# Patient Record
Sex: MALE | Employment: OTHER | URBAN - METROPOLITAN AREA
[De-identification: names, ages, dates, MRNs, and addresses within clinical notes are randomized per-mention and may not be internally consistent; named-entity substitution may affect disease eponyms.]

---

## 2017-03-23 ENCOUNTER — OFFICE VISIT (OUTPATIENT)
Dept: DERMATOLOGY CLINIC | Facility: CLINIC | Age: 82
End: 2017-03-23

## 2017-03-23 DIAGNOSIS — L57.0 ACTINIC KERATOSIS: Primary | ICD-10-CM
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
## 2017-03-23 DIAGNOSIS — L81.4 SOLAR LENTIGO: ICD-10-CM

## 2017-03-23 PROCEDURE — 17000 DESTRUCT PREMALG LESION: CPT | Performed by: DERMATOLOGY

## 2017-03-23 PROCEDURE — 99201 OFFICE/OUTPT VISIT,NEW,LEVL I: CPT | Performed by: DERMATOLOGY

## 2017-03-23 RX ORDER — POTASSIUM CHLORIDE 750 MG/1
CAPSULE, EXTENDED RELEASE ORAL
COMMUNITY
Start: 2016-07-26

## 2017-03-23 RX ORDER — DILTIAZEM HYDROCHLORIDE 120 MG/1
CAPSULE, COATED, EXTENDED RELEASE ORAL
COMMUNITY
Start: 2015-12-16

## 2017-03-23 RX ORDER — TAMSULOSIN HYDROCHLORIDE 0.4 MG/1
CAPSULE ORAL
COMMUNITY
Start: 2005-10-13 | End: 2018-05-30

## 2017-03-23 RX ORDER — WARFARIN SODIUM 2 MG/1
TABLET ORAL
COMMUNITY
Start: 2016-06-13 | End: 2018-05-30

## 2017-03-23 RX ORDER — MULTIVIT WITH MINERALS/LUTEIN
TABLET ORAL
COMMUNITY
Start: 2006-08-18 | End: 2018-05-30

## 2017-03-23 RX ORDER — WARFARIN SODIUM 5 MG/1
TABLET ORAL
COMMUNITY
Start: 2015-04-06 | End: 2018-05-30

## 2017-03-23 RX ORDER — DIPHENOXYLATE HYDROCHLORIDE AND ATROPINE SULFATE 2.5; .025 MG/1; MG/1
TABLET ORAL
COMMUNITY
Start: 2005-10-13 | End: 2018-05-30

## 2017-03-23 RX ORDER — FUROSEMIDE 40 MG/1
TABLET ORAL
COMMUNITY
Start: 2016-08-29 | End: 2018-05-30

## 2017-03-23 NOTE — PROGRESS NOTES
HPI:     Chief Complaint     Warts        HPI     Warts    Additional comments: 1st time LSS with daughter with concerns about lesion left dorsal hand; no h/o skin cancer       Last edited by Lamar Pina RN on 3/23/2017 11:32 AM. (History)        Hugo Oral Tab Take 50 mg by mouth every 6 (six) hours as needed for Pain. Disp:  Rfl:    Potassium Chloride ER (KLOR-CON M20) 20 MEQ Oral Tab CR Take 2 tablets by mouth 2 (two) times daily. Disp:  Rfl:    warfarin (COUMADIN) 5 MG Oral Tab Take 5 mg by mouth.  5 defibrillator No    Reaction to local anesthetic No     Social History Narrative     Family History   Problem Relation Age of Onset   • Other Father      MVA   • Heart Disorder Mother    • Cancer Brother    • Cancer Brother        PHYSICAL EXAM:   Patient

## 2017-03-28 PROCEDURE — 82607 VITAMIN B-12: CPT | Performed by: INTERNAL MEDICINE

## 2018-02-01 ENCOUNTER — OFFICE VISIT (OUTPATIENT)
Dept: DERMATOLOGY CLINIC | Facility: CLINIC | Age: 83
End: 2018-02-01

## 2018-02-01 DIAGNOSIS — D48.5 NEOPLASM OF UNCERTAIN BEHAVIOR OF SKIN: Primary | ICD-10-CM

## 2018-02-01 PROCEDURE — 88305 TISSUE EXAM BY PATHOLOGIST: CPT | Performed by: DERMATOLOGY

## 2018-02-01 PROCEDURE — 11100 BIOPSY OF SKIN LESION: CPT | Performed by: DERMATOLOGY

## 2018-02-01 NOTE — PROGRESS NOTES
Past Medical History:   Diagnosis Date   • ATRIAL FIBRILLATION    • Atrial fibrillation [facility])    • CORONARY ARTERY DISEASE    • Essential hypertension    • HYPERLIPIDEMIA    • HYPERTENSION    • OTHER DISEASES     paravertebral mass 2004   • OTHER DISEASES

## 2018-02-01 NOTE — PROGRESS NOTES
HPI:     Chief Complaint     Lesion        HPI     Lesion    Additional comments: LOV- 3-23-17. Pt presenting today with raised lesion to right side of lip x 3 months. Pt denies a personal/family hx of SC.         Last edited by Monse Snyder, [address] [place] [address] Alysia on 2/1/ MCG/ACT Nasal Suspension SPRAY TWICE IN EACH NOSTRIL DAILY Disp: 3 Bottle Rfl: 1   nystatin (MYCOSTATIN) 384747 UNIT/ML Mouth/Throat Suspension Swish and spit twice daily for 2-3 weeks Disp: 120 mL Rfl: 0   furosemide (LASIX) 40 MG Oral Tab Take 1 tablet b varicose vein stripping    Social History  Social History   Marital status:    Spouse name: N/A    Years of education: N/A  Number of children: N/A     Occupational History  None on file     Social History Main Topics   Smoking status: Never Smoker

## 2018-02-14 ENCOUNTER — TELEPHONE (OUTPATIENT)
Dept: DERMATOLOGY CLINIC | Facility: CLINIC | Age: 83
End: 2018-02-14

## 2018-02-14 NOTE — TELEPHONE ENCOUNTER
Call received from pt r/e bx letter that he received - results reviewed with pt (polypoid Seborrheic keratosis, inflamed)

## 2018-03-08 PROBLEM — F02.81 LATE ONSET ALZHEIMER'S DISEASE WITH BEHAVIORAL DISTURBANCE (HCC): Status: ACTIVE | Noted: 2018-03-08

## 2018-03-08 PROBLEM — G30.1 LATE ONSET ALZHEIMER'S DISEASE WITH BEHAVIORAL DISTURBANCE (HCC): Status: ACTIVE | Noted: 2018-03-08

## (undated) NOTE — LETTER
2/5/2018              Tiffany Lemos        [address]         Dear Angus George,      The report of the Biopsy done on 02/01/18 shows a Polypoid seborrheic keratosis, inflamed .   This is a benign (not cancerous) growth, and requires

## (undated) NOTE — MR AVS SNAPSHOT
[facility] [place] - [place] [address] [address] [id number] [phone]               Thank you for choosing us for your health care visit with Julia Bruner MD.  We are glad to serve you and happy to provide you with this summary of y Commonly known as:  LASIX           * LASIX 40 MG Tabs   Generic drug:  furosemide           KLOR-CON M20 20 MEQ Tbcr   Generic drug:  Potassium Chloride ER   Take 2 tablets by mouth 2 (two) times daily.            LIPITOR 20 MG Tabs   Generic drug:  Atorva Visit [email or web address] org to learn more. Educational Information     Healthy Diet and Regular Exercise  [employer] for making healthy food choices  -   Enjoy your food, but eat less.   Fully enjoy your food when ea